# Patient Record
Sex: MALE | Race: WHITE | NOT HISPANIC OR LATINO | Employment: UNEMPLOYED | URBAN - METROPOLITAN AREA
[De-identification: names, ages, dates, MRNs, and addresses within clinical notes are randomized per-mention and may not be internally consistent; named-entity substitution may affect disease eponyms.]

---

## 2022-10-10 ENCOUNTER — THERAPY VISIT (OUTPATIENT)
Dept: PHYSICAL THERAPY | Facility: CLINIC | Age: 27
End: 2022-10-10
Payer: COMMERCIAL

## 2022-10-10 DIAGNOSIS — Z47.89 AFTERCARE FOLLOWING SURGERY OF THE MUSCULOSKELETAL SYSTEM: ICD-10-CM

## 2022-10-10 DIAGNOSIS — M25.572 PAIN IN JOINT INVOLVING ANKLE AND FOOT, LEFT: Primary | ICD-10-CM

## 2022-10-10 PROCEDURE — 97110 THERAPEUTIC EXERCISES: CPT | Mod: GP | Performed by: PHYSICAL THERAPIST

## 2022-10-10 PROCEDURE — 97161 PT EVAL LOW COMPLEX 20 MIN: CPT | Mod: GP | Performed by: PHYSICAL THERAPIST

## 2022-10-10 PROCEDURE — 97530 THERAPEUTIC ACTIVITIES: CPT | Mod: GP | Performed by: PHYSICAL THERAPIST

## 2022-10-10 NOTE — PROGRESS NOTES
Physical Therapy Initial Examination/Evaluation  October 10, 2022    Antonina Flaherty is a 26 year old male referred to physical therapy by Dr. Carrillo for treatment of     Diagnosis: L Syndesmosis Fixation with Tightrope   Precautions/Restrictions/MD instructions/Other pertinent hx per protocol    Therapist Impression:   Antonina is presenting in satisfactory condition following the above procedure.  We will progress per MD protocol/instructions.    GOALS: End of February/March return to football    NEXT: calf raises end range isometrics, BFR, weaning out of boot    PTRX: online    Subjective:  DOI/onset: January 10, 2022 DOS: 8/10/2022  Acute Injury or Gradual Onset?: Acute injury onset  Mechanism of Injury: Football  Related PMH: None Previous Treatment: PT and Surgery Effect of prior treatment: good  Imaging: x-ray and MRI  Chief Complaint/Functional Limitations:   Walking in boot and see below in therapy evaluation codes   Pain: rest 0 /10, activity 10/10 Location: general Frequency: Intermittent Described as: aching, stabbing, sharp and shooting Alleviated by: Rest Progression of Symptoms: Gradually getting better. 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  Job duties: NA  Current HEP/exercise regimen: NA  Patient's goals are see chief complaints     Other pertinent PMH/Red Flags: None   Barriers at home/work: None as reported by patient  Pertinent Surgical History: None  Medications: None as reported by patient  General health as reported by patient: good  Return to MD:  6 weeks    ANKLE EVALUATION    Gait: with boot    Ankle Range of Motion  Ankle AROM Dorsiflexion Plantarflexion Inversion Eversion WBing DF (cm)   Left 20 35 15 10 na   Right 20 50 20 10 na   **WBing DF- distance between wall and great toe where pt can touch knee to wall and keep heel in contact with floor    Ankle Strength  Deferred    Mild swelling  Assessment/Plan:  Patient is a 26  year old male with left side ankle complaints.    Patient has the following significant findings with corresponding treatment plan.                Diagnosis 1:  L Syndesmosis Fixation with Tightrope   Pain -  hot/cold therapy, manual therapy, splint/taping/bracing/orthotics, self management, education and home program  Decreased ROM/flexibility - manual therapy and therapeutic exercise  Decreased strength - therapeutic exercise and therapeutic activities  Edema - vasopneumatics, cold therapy and self management/home program  Impaired gait - gait training  Impaired muscle performance - neuro re-education    Therapy Evaluation Codes:   Cumulative Therapy Evaluation is: Low complexity.    Previous and current functional limitations:  (See Goal Flow Sheet for this information)    Short term and Long term goals: (See Goal Flow Sheet for this information)     Communication ability:  Patient appears to be able to clearly communicate and understand verbal and written communication and follow directions correctly.  Treatment Explanation - The following has been discussed with the patient:   RX ordered/plan of care  Anticipated outcomes  Possible risks and side effects  This patient would benefit from PT intervention to resume normal activities.   Rehab potential is good.    Frequency:  2 X week, once daily  Duration:  for 12 weeks  Discharge Plan:  Achieve all LTG.  Independent in home treatment program.  Reach maximal therapeutic benefit.    Please refer to the daily flowsheet for treatment today, total treatment time and time spent performing 1:1 timed codes.

## 2022-10-12 ENCOUNTER — THERAPY VISIT (OUTPATIENT)
Dept: PHYSICAL THERAPY | Facility: CLINIC | Age: 27
End: 2022-10-12
Payer: COMMERCIAL

## 2022-10-12 DIAGNOSIS — M25.572 PAIN IN JOINT INVOLVING ANKLE AND FOOT, LEFT: Primary | ICD-10-CM

## 2022-10-12 DIAGNOSIS — Z47.89 AFTERCARE FOLLOWING SURGERY OF THE MUSCULOSKELETAL SYSTEM: ICD-10-CM

## 2022-10-12 PROCEDURE — 97530 THERAPEUTIC ACTIVITIES: CPT | Mod: GP | Performed by: PHYSICAL THERAPIST

## 2022-10-12 PROCEDURE — 97110 THERAPEUTIC EXERCISES: CPT | Mod: GP | Performed by: PHYSICAL THERAPIST

## 2022-10-12 PROCEDURE — 97140 MANUAL THERAPY 1/> REGIONS: CPT | Mod: GP | Performed by: PHYSICAL THERAPIST

## 2022-10-12 ASSESSMENT — ACTIVITIES OF DAILY LIVING (ADL)
HIGHEST_POTENTIAL_ADL_SCORE:: 72
TOTAL_ADL_ITEM_SCORE:: 18

## 2022-10-12 NOTE — PROGRESS NOTES
Therapist Impression:   Initiated BFR and STM.  Start weaning out of boot more as able.    GOALS: End of February/March return to football    NEXT: calf raises end range isometrics, BFR, weaning out of boot    PTRX: online    Subjective:  Doing well.  Keeping up with exercises.    Objective:  Gait: decreased push off and hip movement in terminal extension  NOTES:   Date  10/12 Limb Occlusion Pressure  153 Percent (%) Occlusion  80 Training Occlusion Pressure  122 Exercises Performed  SLR flex 30/15/15/15  SLR abd 30/15/15/green 15   Total time under tourniquet  6:20  6:30   Immediate effects  9/10  10/10 Lingering effects (from previous session)  NA   Blood Flow Restriction Training: Contraindications and precautions reviewed, pt safe for use of modality, Risks and benefits discussed; pt gave informed consent

## 2022-10-19 NOTE — PROGRESS NOTES
[facility]    OUTPATIENT Physical Therapy ORTHOPEDIC EVALUATION  PLAN OF TREATMENT FOR OUTPATIENT REHABILITATION  (COMPLETE FOR INITIAL CLAIMS ONLY)  Patient's Last Name, First Name, M.I.  YOB: 1995  Antonina Flaherty    Provider s Name:  [facility]   Medical Record No.  [id number]   Start of Care Date:  10/10/22   Onset Date:  08/10/22    Treatment Diagnosis:  L Syndesmosis Fixation with Tightrope Medical Diagnosis:     Pain in joint involving ankle and foot, left  Aftercare following surgery of the musculoskeletal system       Goals:     10/12/22 0500   Body Part   Goals listed below are for L Syndesmosis Fixation with Tightrope   Goal #1   Goal #1 ambulation   Previous Functional Level No restrictions   Performance Level with boot   STG Target Performance Minutes patient will be able to walk   Performance Level 5-10 without boot   Rationale for safe household ambulation;for safe outdoor household ambulation;for safe community ambulation;for safe work place ambulation   Due Date 11/02/22    LTG Target Performance Minutes patient will be able to  walk   Performance Level 30   Rationale for safe household ambulation;for safe outdoor household ambulation;for safe community ambulation;for safe work place ambulation   Due Date 12/07/22       Therapy Frequency:  2 x week  Predicted Duration of Therapy Intervention:  12 weeks    Martín Patel, SKYLER                 I CERTIFY THE NEED FOR THESE SERVICES FURNISHED UNDER        THIS PLAN OF TREATMENT AND WHILE UNDER MY CARE .             Physician Signature               Date    X_____________________________________________________                         Certification Date From:  10/10/22   Certification Date To:  01/07/23    Referring Provider:  Anton Carrillo    Initial Assessment        See Epic Evaluation SOC Date:  10/10/22

## 2022-10-21 ENCOUNTER — THERAPY VISIT (OUTPATIENT)
Dept: PHYSICAL THERAPY | Facility: CLINIC | Age: 27
End: 2022-10-21
Payer: COMMERCIAL

## 2022-10-21 DIAGNOSIS — M25.572 PAIN IN JOINT INVOLVING ANKLE AND FOOT, LEFT: Primary | ICD-10-CM

## 2022-10-21 DIAGNOSIS — Z47.89 AFTERCARE FOLLOWING SURGERY OF THE MUSCULOSKELETAL SYSTEM: ICD-10-CM

## 2022-10-21 PROCEDURE — 97110 THERAPEUTIC EXERCISES: CPT | Mod: GP | Performed by: PHYSICAL THERAPIST

## 2022-10-21 PROCEDURE — 97530 THERAPEUTIC ACTIVITIES: CPT | Mod: GP | Performed by: PHYSICAL THERAPIST

## 2022-10-21 NOTE — PROGRESS NOTES
Therapist Impression:   Progressed to squats and balance.  Progress to BFR on leg press next    GOALS: End of February/March return to football    NEXT: calf raises end range isometrics, BFR, weaning out of boot    PTRX: online    Subjective:  Doing well.  Keeping up with exercises.    Objective:  Gait: no obvious findings  Normal forefoot mobility, TTP along transverse and medial arches  NOTES:   Date  10/12 Limb Occlusion Pressure  208 Percent (%) Occlusion  70 Training Occlusion Pressure  146   Exercises Performed  SLR flex 30/15/15/15  SLR abd 30/15/15/15   Total time under tourniquet  6:20  6:30   Immediate effects  9/10  9/10 Lingering effects (from previous session)  NA   Blood Flow Restriction Training: Contraindications and precautions reviewed, pt safe for use of modality, Risks and benefits discussed; pt gave informed consent

## 2022-10-24 ENCOUNTER — THERAPY VISIT (OUTPATIENT)
Dept: PHYSICAL THERAPY | Facility: CLINIC | Age: 27
End: 2022-10-24
Payer: COMMERCIAL

## 2022-10-24 DIAGNOSIS — M25.572 PAIN IN JOINT INVOLVING ANKLE AND FOOT, LEFT: Primary | ICD-10-CM

## 2022-10-24 DIAGNOSIS — Z47.89 AFTERCARE FOLLOWING SURGERY OF THE MUSCULOSKELETAL SYSTEM: ICD-10-CM

## 2022-10-24 PROCEDURE — 97110 THERAPEUTIC EXERCISES: CPT | Mod: GP | Performed by: PHYSICAL THERAPIST

## 2022-10-24 PROCEDURE — 97140 MANUAL THERAPY 1/> REGIONS: CPT | Mod: GP | Performed by: PHYSICAL THERAPIST

## 2022-10-24 PROCEDURE — 97530 THERAPEUTIC ACTIVITIES: CPT | Mod: GP | Performed by: PHYSICAL THERAPIST

## 2022-10-24 NOTE — PROGRESS NOTES
Therapist Impression:   Suspect midfoot/plantar fascia irritation secondary to limiting ankle ROM with movement.    GOALS: End of February/March return to football    NEXT: calf raises end range isometrics, BFR, weaning out of boot    PTRX: online    Subjective:  Foot is painful to walk.  After last session and all through the weekend felt good.  Not sure what it is .      Objective:  Gait: no obvious findings  TTP along transverse and medial arches and plantar fascia  NOTES:   Date  10/12 Limb Occlusion Pressure  201 Percent (%) Occlusion  70 Training Occlusion Pressure  141   Exercises Performed  Leg press SH 3 6 pl 30/15/15 6.75 15 with 20 sec iso hold  SLR 30/15/15/15   Total time under tourniquet  7 min  6:30 min   Immediate effects  9/10  9/10 Lingering effects (from previous session)  NA   Blood Flow Restriction Training: Contraindications and precautions reviewed, pt safe for use of modality, Risks and benefits discussed; pt gave informed consent

## 2022-10-28 ENCOUNTER — THERAPY VISIT (OUTPATIENT)
Dept: PHYSICAL THERAPY | Facility: CLINIC | Age: 27
End: 2022-10-28
Payer: COMMERCIAL

## 2022-10-28 DIAGNOSIS — M25.572 PAIN IN JOINT INVOLVING ANKLE AND FOOT, LEFT: Primary | ICD-10-CM

## 2022-10-28 DIAGNOSIS — Z47.89 AFTERCARE FOLLOWING SURGERY OF THE MUSCULOSKELETAL SYSTEM: ICD-10-CM

## 2022-10-28 PROCEDURE — 97110 THERAPEUTIC EXERCISES: CPT | Mod: GP | Performed by: PHYSICAL THERAPIST

## 2022-10-28 PROCEDURE — 97140 MANUAL THERAPY 1/> REGIONS: CPT | Mod: GP | Performed by: PHYSICAL THERAPIST

## 2022-10-28 PROCEDURE — 97530 THERAPEUTIC ACTIVITIES: CPT | Mod: GP | Performed by: PHYSICAL THERAPIST

## 2022-10-28 NOTE — PROGRESS NOTES
Therapist Impression:   Suspect midfoot/plantar fascia irritation secondary to limiting ankle ROM with movement.    GOALS: End of February/March return to football    NEXT: calf raises end range isometrics, BFR, step ups    PTRX: online    Subjective:  Foot is maybe a little better.      Objective:  Gait: no obvious findings  No change in swelling.  Mild visual swelling.  TTP along transverse and medial arches and plantar fascia  NOTES:   Date  10/28 Limb Occlusion Pressure  208 Percent (%) Occlusion  70 Training Occlusion Pressure  144   Exercises Performed  Single leg leg press SH 3 3 pl 30/15/ 3.75 pl 15/15  Leg press SH 3 6.5 pl 30/15/15/15     Total time under tourniquet  6:30  min  7 min   Immediate effects  10/10  9/10 Lingering effects (from previous session)  None   Blood Flow Restriction Training: Contraindications and precautions reviewed, pt safe for use of modality, Risks and benefits discussed; pt gave informed consent

## 2022-11-02 ENCOUNTER — THERAPY VISIT (OUTPATIENT)
Dept: PHYSICAL THERAPY | Facility: CLINIC | Age: 27
End: 2022-11-02
Payer: COMMERCIAL

## 2022-11-02 DIAGNOSIS — Z47.89 AFTERCARE FOLLOWING SURGERY OF THE MUSCULOSKELETAL SYSTEM: ICD-10-CM

## 2022-11-02 DIAGNOSIS — M25.572 PAIN IN JOINT INVOLVING ANKLE AND FOOT, LEFT: Primary | ICD-10-CM

## 2022-11-02 PROCEDURE — 97110 THERAPEUTIC EXERCISES: CPT | Mod: GP | Performed by: PHYSICAL THERAPIST

## 2022-11-02 PROCEDURE — 97530 THERAPEUTIC ACTIVITIES: CPT | Mod: GP | Performed by: PHYSICAL THERAPIST

## 2022-11-02 NOTE — PROGRESS NOTES
Therapist Impression:   Doing well.  Initiated SL step downs and progressed balance.  Consider running in 1-2 weeks    GOALS: End of February/March return to football    NEXT: calf raises end range isometrics, BFR, step ups    PTRX: online    Subjective:  Doing good.  Doing some leg days in the gym      Objective:  Gait: no obvious findings  NOTES:   Date  11/2 Limb Occlusion Pressure  201 Percent (%) Occlusion  70 Training Occlusion Pressure  141   Exercises Performed  Single leg leg press SH 3 3 pl 30/15/15/15  Leg press SH 3 6.5 pl 30/15/15/15     Total time under tourniquet  6:30  min  7 min   Immediate effects  9/10  10/10 Lingering effects (from previous session)  None   Blood Flow Restriction Training: Contraindications and precautions reviewed, pt safe for use of modality, Risks and benefits discussed; pt gave informed consent         
Never smoker

## 2022-11-09 ENCOUNTER — THERAPY VISIT (OUTPATIENT)
Dept: PHYSICAL THERAPY | Facility: CLINIC | Age: 27
End: 2022-11-09
Payer: COMMERCIAL

## 2022-11-09 DIAGNOSIS — M25.572 PAIN IN JOINT INVOLVING ANKLE AND FOOT, LEFT: Primary | ICD-10-CM

## 2022-11-09 DIAGNOSIS — Z47.89 AFTERCARE FOLLOWING SURGERY OF THE MUSCULOSKELETAL SYSTEM: ICD-10-CM

## 2022-11-09 PROCEDURE — 97110 THERAPEUTIC EXERCISES: CPT | Mod: GP | Performed by: PHYSICAL THERAPIST

## 2022-11-09 PROCEDURE — 97530 THERAPEUTIC ACTIVITIES: CPT | Mod: GP | Performed by: PHYSICAL THERAPIST

## 2022-11-30 ENCOUNTER — VIRTUAL VISIT (OUTPATIENT)
Dept: PHYSICAL THERAPY | Facility: CLINIC | Age: 27
End: 2022-11-30
Payer: COMMERCIAL

## 2022-11-30 DIAGNOSIS — Z47.89 AFTERCARE FOLLOWING SURGERY OF THE MUSCULOSKELETAL SYSTEM: ICD-10-CM

## 2022-11-30 DIAGNOSIS — M25.572 PAIN IN JOINT INVOLVING ANKLE AND FOOT, LEFT: Primary | ICD-10-CM

## 2022-11-30 PROCEDURE — 97110 THERAPEUTIC EXERCISES: CPT | Mod: GP | Performed by: PHYSICAL THERAPIST

## 2022-11-30 PROCEDURE — 97530 THERAPEUTIC ACTIVITIES: CPT | Mod: GP | Performed by: PHYSICAL THERAPIST

## 2022-11-30 NOTE — CONFIDENTIAL NOTE
"Therapist Impression:   Exam over video suggests calf/Achilles irritation, not joint or posterior impingement.  Plan is to:  1. Continue general LE strengthening and balance  2. Progress calf raises double to up 2/down 1, to single leg  3. Incorporate cardio such as bike and/or elliptical  4. End range isometrics    GOALS: End of February/March return to football    NEXT: calf stretching if needed, plyo progression    PTRX: online    Subjective:  Did 5 x 30\" jump ropes and bothered ankle.  Walking bothers ankle and the bottom of foot.  Barefoot bothers foot.  Walking with shoes bothers ankle.      Objective:  WBing DF 2\" L and 4\" L  No pain with PROM PF  Mild discomfort with calf raises.    "

## 2022-12-05 ENCOUNTER — THERAPY VISIT (OUTPATIENT)
Dept: PHYSICAL THERAPY | Facility: CLINIC | Age: 27
End: 2022-12-05
Payer: COMMERCIAL

## 2022-12-05 DIAGNOSIS — Z47.89 AFTERCARE FOLLOWING SURGERY OF THE MUSCULOSKELETAL SYSTEM: ICD-10-CM

## 2022-12-05 DIAGNOSIS — M25.572 PAIN IN JOINT INVOLVING ANKLE AND FOOT, LEFT: Primary | ICD-10-CM

## 2022-12-05 PROCEDURE — 97530 THERAPEUTIC ACTIVITIES: CPT | Mod: GP | Performed by: PHYSICAL THERAPIST

## 2022-12-05 PROCEDURE — 97110 THERAPEUTIC EXERCISES: CPT | Mod: GP | Performed by: PHYSICAL THERAPIST

## 2022-12-09 NOTE — PROGRESS NOTES
Therapist Impression:   Ok to continue light jog.  Added in some lateral strengthening, and ankle diagonals to address peroneal tendon pain.  Added in some hopping onto [employer] ball for balance.    GOALS: End of February/March return to football    NEXT: calf raises end range isometrics, BFR, step ups    PTRX: online    Subjective:  Did have some ankle/claf discomfort, but doing better.    Objective:  Pain with resisted PF/Eversion.  TTP along peroneal tendon insertion.

## 2022-12-30 ENCOUNTER — THERAPY VISIT (OUTPATIENT)
Dept: PHYSICAL THERAPY | Facility: CLINIC | Age: 27
End: 2022-12-30
Payer: COMMERCIAL

## 2022-12-30 DIAGNOSIS — Z47.89 AFTERCARE FOLLOWING SURGERY OF THE MUSCULOSKELETAL SYSTEM: ICD-10-CM

## 2022-12-30 DIAGNOSIS — M25.572 PAIN IN JOINT INVOLVING ANKLE AND FOOT, LEFT: Primary | ICD-10-CM

## 2022-12-30 PROCEDURE — 97110 THERAPEUTIC EXERCISES: CPT | Mod: GP | Performed by: PHYSICAL THERAPIST

## 2022-12-30 PROCEDURE — 97530 THERAPEUTIC ACTIVITIES: CPT | Mod: GP | Performed by: PHYSICAL THERAPIST

## 2022-12-30 PROCEDURE — 97140 MANUAL THERAPY 1/> REGIONS: CPT | Mod: GP | Performed by: PHYSICAL THERAPIST

## 2022-12-30 NOTE — PROGRESS NOTES
Therapist Impression:   Progress to faster run.  Work to get WBing DF restored.  Progress to jumping next session    GOALS: End of February/March return to football    NEXT: calf raises end range isometrics, BFR, step ups    PTRX: online    Subjective:  Past 2-3 days sore around surgery area.  Foot pain is gone.       Objective:  Pain with resisted PF/Eversion.  TTP along peroneal tendon insertion.      Ankle Range of Motion  Ankle AROM Dorsiflexion Plantarflexion Inversion Eversion WBing DF (cm)   Left wnl wnl wnl wnl 6.5   Right wnl wnl wnl wnl 9   **WBing DF- distance between wall and great toe where pt can touch knee to wall and keep heel in contact with floor    Ankle Strength  Ankle MMT Dorsiflexion Plantarflexion Inversion Eversion   Left 5/5 5/5 5/5 5/5   Right na/5 na/5 na/5 na/5     Provocation tests  Resisted (tedon) Left   PF/EV (peroneals) Pain   DF/EV (ext. dig) Pain-free     Palpation  Left: Mild tenderness to palpation at L fibula

## 2023-01-09 ENCOUNTER — THERAPY VISIT (OUTPATIENT)
Dept: PHYSICAL THERAPY | Facility: CLINIC | Age: 28
End: 2023-01-09
Payer: COMMERCIAL

## 2023-01-09 DIAGNOSIS — M25.572 PAIN IN JOINT INVOLVING ANKLE AND FOOT, LEFT: Primary | ICD-10-CM

## 2023-01-09 DIAGNOSIS — Z47.89 AFTERCARE FOLLOWING SURGERY OF THE MUSCULOSKELETAL SYSTEM: ICD-10-CM

## 2023-01-09 PROCEDURE — 97530 THERAPEUTIC ACTIVITIES: CPT | Mod: GP | Performed by: PHYSICAL THERAPIST

## 2023-01-09 PROCEDURE — 97110 THERAPEUTIC EXERCISES: CPT | Mod: GP | Performed by: PHYSICAL THERAPIST

## 2023-01-09 NOTE — PROGRESS NOTES
Therapist Impression:   Progress to faster run.  Progressed to jumping double leg.  Progress to 1 leg hops in 1 week if tolerated.    GOALS: End of February/March return to football    NEXT: calf raises end range isometrics, BFR, step ups    PTRX: online    Subjective:  Past 2-3 days sore around surgery area.  Foot pain is gone.       Objective:  Pain with resisted PF/Eversion.  TTP along peroneal tendon insertion.      Ankle Range of Motion  Ankle AROM Dorsiflexion Plantarflexion Inversion Eversion WBing DF (cm)   Left wnl wnl wnl wnl 8   Right wnl wnl wnl wnl 9   **WBing DF- distance between wall and great toe where pt can touch knee to wall and keep heel in contact with floor    Palpation  Left: Mild tenderness to palpation at 5th metatarsal

## 2023-01-23 ENCOUNTER — THERAPY VISIT (OUTPATIENT)
Dept: PHYSICAL THERAPY | Facility: CLINIC | Age: 28
End: 2023-01-23
Payer: COMMERCIAL

## 2023-01-23 DIAGNOSIS — M25.572 PAIN IN JOINT INVOLVING ANKLE AND FOOT, LEFT: Primary | ICD-10-CM

## 2023-01-23 DIAGNOSIS — Z47.89 AFTERCARE FOLLOWING SURGERY OF THE MUSCULOSKELETAL SYSTEM: ICD-10-CM

## 2023-01-23 PROCEDURE — 97530 THERAPEUTIC ACTIVITIES: CPT | Mod: GP | Performed by: PHYSICAL THERAPIST

## 2023-01-23 PROCEDURE — 97110 THERAPEUTIC EXERCISES: CPT | Mod: 59 | Performed by: PHYSICAL THERAPIST

## 2023-01-23 NOTE — PROGRESS NOTES
Therapist Impression:   See below for new program.    GOALS: End of February/March return to football    NEXT: calf raises end range isometrics, BFR, step ups    PTRX: online    Subjective:  Feeling good.  Ramping his speed up.  Did some pool workouts.  Did some fast running in sand.        Objective:  Phase I Weeks 1/23 -2/5 Monday - Sprint / UE lifting  Tuesday - Plyos / LE lifting  Wednesday - Recovery or biking or make up day  Thursday - Sprint / UE lifting  Friday - Plyos / LE lifting  Saturday - Hand / eye coordination, recovery  Sunday - Yoga    Phase II Week 2/5 - 2/12 Monday - Sprint / UE lifting  Tuesday - Plyos /  LE lifting  Wednesday - Sprint / Pilates  Thursday - Hand / eye coordination, recovery   Friday - Sprint / UE lifting  Saturday - Plyos / LE lifting  Sunday - Yoga   Phase III Week 2/13 - 3/6  Monday - Sprint / UE lifting  Tuesday - Agility / LE lifting  Wednesday - Sprint / Pilates  Thursday - Agility / LE lifting  Friday - Sprint / UE lifting  Saturday - Hand / eye coordination, recovery  Sunday - Yoga

## 2023-01-30 ENCOUNTER — THERAPY VISIT (OUTPATIENT)
Dept: PHYSICAL THERAPY | Facility: CLINIC | Age: 28
End: 2023-01-30
Payer: COMMERCIAL

## 2023-01-30 DIAGNOSIS — M25.572 PAIN IN JOINT INVOLVING ANKLE AND FOOT, LEFT: Primary | ICD-10-CM

## 2023-01-30 DIAGNOSIS — Z47.89 AFTERCARE FOLLOWING SURGERY OF THE MUSCULOSKELETAL SYSTEM: ICD-10-CM

## 2023-01-30 PROCEDURE — 97110 THERAPEUTIC EXERCISES: CPT | Mod: GP | Performed by: PHYSICAL THERAPIST

## 2023-01-30 PROCEDURE — 97530 THERAPEUTIC ACTIVITIES: CPT | Mod: GP | Performed by: PHYSICAL THERAPIST

## 2023-01-30 NOTE — PROGRESS NOTES
Therapist Impression:   Great strength numbers.  Focus on single leg quadriceps and HS work.  Test hops next.    GOALS: End of February/March return to football    NEXT: calf raises end range isometrics, BFR, step ups    PTRX: online    Subjective:  Doing well.      Objective:  Hip and Knee Strength   MMT Left Right   Hip Abduction 345 36/5   Hip Extension 40/5 44/5   Knee Ext 145/5 172/5   Calf Raises 5/5 5/5   HS 66 59   SL squat for depth full pistol squat B      Phase I Weeks 1/23 -2/5 Monday - Sprint / UE lifting  Tuesday - Plyos / LE lifting  Wednesday - Recovery or biking or make up day  Thursday - Sprint / UE lifting  Friday - Plyos / LE lifting  Saturday - Hand / eye coordination, recovery  Sunday - Yoga    Phase II Week 2/5 - 2/12 Monday - Sprint / UE lifting  Tuesday - Plyos /  LE lifting  Wednesday - Sprint / Pilates  Thursday - Hand / eye coordination, recovery   Friday - Sprint / UE lifting  Saturday - Plyos / LE lifting  Sunday - Yoga     Phase III Week 2/13 - 3/6  Monday - Sprint / UE lifting  Tuesday - Agility / LE lifting  Wednesday - Sprint / Pilates  Thursday - Agility / LE lifting   Friday - Sprint / UE lifting  Saturday - Hand / eye coordination, recovery  Sunday - Yoga

## 2023-04-28 PROBLEM — M25.572 PAIN IN JOINT INVOLVING ANKLE AND FOOT, LEFT: Status: RESOLVED | Noted: 2022-10-12 | Resolved: 2023-04-28

## 2023-04-28 PROBLEM — Z47.89 AFTERCARE FOLLOWING SURGERY OF THE MUSCULOSKELETAL SYSTEM: Status: RESOLVED | Noted: 2022-10-12 | Resolved: 2023-04-28
